# Patient Record
Sex: MALE | Race: WHITE | NOT HISPANIC OR LATINO | Employment: FULL TIME
[De-identification: names, ages, dates, MRNs, and addresses within clinical notes are randomized per-mention and may not be internally consistent; named-entity substitution may affect disease eponyms.]

---

## 2019-09-29 ENCOUNTER — HEALTH MAINTENANCE LETTER (OUTPATIENT)
Age: 39
End: 2019-09-29

## 2021-01-14 ENCOUNTER — HEALTH MAINTENANCE LETTER (OUTPATIENT)
Age: 41
End: 2021-01-14

## 2021-10-23 ENCOUNTER — HEALTH MAINTENANCE LETTER (OUTPATIENT)
Age: 41
End: 2021-10-23

## 2022-02-12 ENCOUNTER — HEALTH MAINTENANCE LETTER (OUTPATIENT)
Age: 42
End: 2022-02-12

## 2022-10-10 ENCOUNTER — HEALTH MAINTENANCE LETTER (OUTPATIENT)
Age: 42
End: 2022-10-10

## 2023-03-25 ENCOUNTER — HEALTH MAINTENANCE LETTER (OUTPATIENT)
Age: 43
End: 2023-03-25

## 2023-07-15 ENCOUNTER — OFFICE VISIT (OUTPATIENT)
Dept: URGENT CARE | Facility: URGENT CARE | Age: 43
End: 2023-07-15
Payer: COMMERCIAL

## 2023-07-15 VITALS
SYSTOLIC BLOOD PRESSURE: 134 MMHG | DIASTOLIC BLOOD PRESSURE: 90 MMHG | HEART RATE: 67 BPM | RESPIRATION RATE: 18 BRPM | OXYGEN SATURATION: 100 % | TEMPERATURE: 98.2 F | WEIGHT: 171.8 LBS

## 2023-07-15 DIAGNOSIS — G43.809 OTHER MIGRAINE WITHOUT STATUS MIGRAINOSUS, NOT INTRACTABLE: Primary | ICD-10-CM

## 2023-07-15 DIAGNOSIS — Z76.0 MEDICATION REFILL: ICD-10-CM

## 2023-07-15 PROCEDURE — 99203 OFFICE O/P NEW LOW 30 MIN: CPT | Mod: 25 | Performed by: FAMILY MEDICINE

## 2023-07-15 PROCEDURE — 96372 THER/PROPH/DIAG INJ SC/IM: CPT | Performed by: FAMILY MEDICINE

## 2023-07-15 RX ORDER — SUMATRIPTAN 25 MG/1
25 TABLET, FILM COATED ORAL
Qty: 9 TABLET | Refills: 0 | Status: SHIPPED | OUTPATIENT
Start: 2023-07-15 | End: 2023-08-21

## 2023-07-15 RX ORDER — SUMATRIPTAN 100 MG/1
100 TABLET, FILM COATED ORAL PRN
COMMUNITY
Start: 2023-03-21 | End: 2023-08-21

## 2023-07-15 RX ORDER — SUMATRIPTAN 6 MG/.5ML
6 INJECTION, SOLUTION SUBCUTANEOUS ONCE
Status: COMPLETED | OUTPATIENT
Start: 2023-07-15 | End: 2023-07-15

## 2023-07-15 RX ADMIN — SUMATRIPTAN 6 MG: 6 INJECTION, SOLUTION SUBCUTANEOUS at 09:42

## 2023-07-15 NOTE — PROGRESS NOTES
Chief Complaint   Patient presents with     Migraine     Need refill on Imitrex        Varun was seen today for migraine.    Diagnoses and all orders for this visit:    Other migraine without status migrainosus, not intractable  -     SUMAtriptan (IMITREX) injection 6 mg  -     SUMAtriptan (IMITREX) 25 MG tablet; Take 1 tablet (25 mg) by mouth at onset of headache for migraine May repeat in 2 hours. Max 8 tablets/24 hours.    Medication refill    MDM  The patient presented with a headache consistent with their normal migraine. The patient has not had any fever, weakness, neck stiffness or confusion. There is no evidence of meningitisThe pain has improved with medication interventions.  The patient should follow-up with their primary physician within 3 days. Return if increasing pain, fever, vomiting or weakness.  Take medications as directed.also was given a refill for imitrex which would help with future migraines   As this headache is similar to other migraine episodes no further work-up was needed at this point.  For the eczema noted in first of the year patient does use topical steroid ointment to help with the symptoms.  He did start feeling better after the imitrex shot       SUBJECTIVE:  Varun Law is a 43 year old male who comes in for migraine headache.  Headache began 1day(s)}ago and is sudden onset  DESCRIPTION OF HEADACHE:   Location of pain: right-sided unilateral   Radiation of pain?: NO   Character of pain:aching   Severity of pain: moderate   Accompanying symptoms: nausea and photophobia   Prodromal sx?: none   Rapidity of onset: abrupt     History of Migranes: Yes   Are most headaches similar in presentation? YES    TYPICAL PRECIPITANTS OF HEADACHE: none    CURRENT USE OF MEDS TO TREAT HA:   Abortive meds? aspirin/acetaminophen/caffeine and sumatriptan PO    Daily use? NO   Prophylactic meds? none    ADDITIONAL RELEVANT HISTORY:  Exposure to carbon monoxide? NO  Substance use: denies any  use  Neurologic ROS: Denies, dizziness, syncope, focal weakness, sensory deficits, paresthesias, aphasia and tremors.    History reviewed. No pertinent past medical history.  Current Outpatient Medications   Medication Sig Dispense Refill     SUMAtriptan (IMITREX) 25 MG tablet Take 1 tablet (25 mg) by mouth at onset of headache for migraine May repeat in 2 hours. Max 8 tablets/24 hours. 9 tablet 0     desonide (DESOWEN) 0.05 % cream Apply sparingly twice per day as needed to affected area until the skin better, then stop. (Patient not taking: Reported on 7/15/2023) 60 g 0     SUMAtriptan (IMITREX) 100 MG tablet Take 100 mg by mouth as needed (Patient not taking: Reported on 7/15/2023)       Social History     Tobacco Use     Smoking status: Never     Smokeless tobacco: Current     Last attempt to quit: 1/7/2007   Substance Use Topics     Alcohol use: Yes     Comment: occasionally       ROS:   Review of systems negative except as stated above.  OBJECTIVE:  BP (!) 134/90 (BP Location: Right arm, Patient Position: Sitting, Cuff Size: Adult Large)   Pulse 67   Temp 98.2  F (36.8  C) (Oral)   Resp 18   Wt 77.9 kg (171 lb 12.8 oz)   SpO2 100%   GENERAL APPEARANCE: healthy, alert and no distress  EYES: EOMI,  PERRL, conjunctiva clear  HENT: ear canals and TM's normal.  Nose and mouth without ulcers, erythema or lesions  Skin - there is skin excoriation noted in front of the ears bilaterally   NECK: supple, nontender, no lymphadenopathy, no stiffness noted   CV: regular rates and rhythm, normal S1 S2, no murmur noted  NEURO: Normal strength and tone, sensory exam grossly normal,  normal speech and mentation  PSYCH: mentation appears normal    Yanely Capps MD

## 2023-07-15 NOTE — PATIENT INSTRUCTIONS
Follow up if  symptoms fail to improve or worsens   Pt understood and agreed with plan       Yanely Capps MD

## 2023-08-21 ENCOUNTER — VIRTUAL VISIT (OUTPATIENT)
Dept: INTERNAL MEDICINE | Facility: CLINIC | Age: 43
End: 2023-08-21
Payer: COMMERCIAL

## 2023-08-21 DIAGNOSIS — G43.809 OTHER MIGRAINE WITHOUT STATUS MIGRAINOSUS, NOT INTRACTABLE: Primary | ICD-10-CM

## 2023-08-21 PROCEDURE — 99214 OFFICE O/P EST MOD 30 MIN: CPT | Mod: 95 | Performed by: INTERNAL MEDICINE

## 2023-08-21 RX ORDER — SUMATRIPTAN 50 MG/1
50-100 TABLET, FILM COATED ORAL
Qty: 12 TABLET | Refills: 5 | Status: SHIPPED | OUTPATIENT
Start: 2023-08-21 | End: 2024-07-02

## 2023-08-21 NOTE — PROGRESS NOTES
"Varun is a 43 year old who is being evaluated via a billable video visit.      How would you like to obtain your AVS? MyChart  If the video visit is dropped, the invitation should be resent by: Text to cell phone: [phone]  Will anyone else be joining your video visit? No          Assessment & Plan     Other migraine without status migrainosus, not intractable  Having 4-8 migraines/mo currently. Would be good candidate for a preventative. Rec'd in-clinic visit w/a provider to discuss options.  Until then cont triptan for prn use    - SUMAtriptan (IMITREX) 50 MG tablet; Take 1-2 tablets ( mg) by mouth at onset of headache for migraine , may repeat after 2 hours if needed (Max 200 mg within 24 hrs)      I spent a total of 30 minutes on the day of the visit.   Time spent by me doing chart review, history and exam, documentation and further activities per the note           Sly Krueger MD  [facility]    Subjective   Varun is a 43 year old, presenting for the following health issues:  Migraine      History of Present Illness       Headaches:   Since the patient's last clinic visit, headaches are: worsened  The patient is getting headaches:  Several a month  He is not able to do normal daily activities when he has a migraine.  The patient is taking the following rescue/relief medications:  Naproxyn (Aleve) and sumatriptan (Imitrex)   Patient states \"The relief is inconsistent\" from the rescue/relief medications.   The patient is taking the following medications to prevent migraines:  No medications to prevent migraines  In the past 4 weeks, the patient has gone to an Urgent Care or Emergency Room 0 times times due to headaches.    He eats 2-3 servings of fruits and vegetables daily.He consumes 4 sweetened beverage(s) daily.He exercises with enough effort to increase his heart rate 10 to 19 minutes per day.  He exercises with enough effort to increase his heart rate 5 days " per week.   He is taking medications regularly.               Review of Systems         Objective           Vitals:  No vitals were obtained today due to virtual visit.    Physical Exam   GENERAL: alert and no distress  NEURO: Cranial nerves grossly intact.  Mentation and speech appropriate for age.                Video-Visit Details    Type of service:  Video Visit     Originating Location (pt. Location): Home    Distant Location (provider location):  On-site  Platform used for Video Visit: Compl[employer]

## 2024-02-24 ENCOUNTER — OFFICE VISIT (OUTPATIENT)
Dept: URGENT CARE | Facility: URGENT CARE | Age: 44
End: 2024-02-24
Payer: COMMERCIAL

## 2024-02-24 VITALS
OXYGEN SATURATION: 98 % | SYSTOLIC BLOOD PRESSURE: 150 MMHG | RESPIRATION RATE: 18 BRPM | TEMPERATURE: 98 F | HEART RATE: 85 BPM | DIASTOLIC BLOOD PRESSURE: 105 MMHG | WEIGHT: 186 LBS

## 2024-02-24 DIAGNOSIS — R03.0 ELEVATED BLOOD PRESSURE READING WITHOUT DIAGNOSIS OF HYPERTENSION: ICD-10-CM

## 2024-02-24 DIAGNOSIS — M70.52 PATELLAR BURSITIS OF LEFT KNEE: Primary | ICD-10-CM

## 2024-02-24 PROCEDURE — 99213 OFFICE O/P EST LOW 20 MIN: CPT | Performed by: PHYSICIAN ASSISTANT

## 2024-02-24 RX ORDER — NAPROXEN 500 MG/1
500 TABLET ORAL 2 TIMES DAILY WITH MEALS
Qty: 30 TABLET | Refills: 3 | Status: SHIPPED | OUTPATIENT
Start: 2024-02-24 | End: 2025-02-23

## 2024-02-24 NOTE — PROGRESS NOTES
Assessment & Plan     Patellar bursitis of left knee    Rest, ice compresses  Ace wrap  Avoid kneeling and placing pressure on knee    Start on medications  - naproxen (NAPROSYN) 500 MG tablet; Take 1 tablet (500 mg) by mouth 2 times daily (with meals)    Follow up with ortho if you want to have this drained      Elevated blood pressure reading without diagnosis of hypertension    Patient advised to follow up with PCP for recheck blood pressure   Information given to patient on diet modifications, including lowering salt in diet, decrease calories, weight loss and exercise.  Monitor blood pressure at home and if BP maintains over 140/90 then advise having a recheck with PCP in 2 weeks.       CONSULTATION/REFERRAL to Ortho    No follow-ups on file.    Roseanna Bond is a 43 year old, presenting for the following health issues:  Knee Pain (Bursitis on the left knee. Started this afternoon )    HPI     Review of Systems  Constitutional, HEENT, cardiovascular, pulmonary, gi and gu systems are negative, except as otherwise noted.      Objective    BP (!) 150/105 (BP Location: Left arm, Patient Position: Sitting, Cuff Size: Adult Regular)   Pulse 85   Temp 98  F (36.7  C) (Oral)   Resp 18   Wt 84.4 kg (186 lb)   SpO2 98%   There is no height or weight on file to calculate BMI.  Physical Exam   GENERAL: alert and no distress  MS: pos for left knee swelling on top of knee cap  SKIN: pos for gold ball size bursitis left anterior knee  NEURO: Normal strength and tone, mentation intact and speech normal  PSYCH: mentation appears normal, affect normal/bright          Signed Electronically by: Andre Fitzgerald, [facility], PASuzieC

## 2024-05-26 ENCOUNTER — HEALTH MAINTENANCE LETTER (OUTPATIENT)
Age: 44
End: 2024-05-26

## 2024-07-02 DIAGNOSIS — G43.809 OTHER MIGRAINE WITHOUT STATUS MIGRAINOSUS, NOT INTRACTABLE: ICD-10-CM

## 2024-07-02 RX ORDER — SUMATRIPTAN 50 MG/1
50-100 TABLET, FILM COATED ORAL
Qty: 12 TABLET | Refills: 0 | Status: SHIPPED | OUTPATIENT
Start: 2024-07-02

## 2024-07-02 NOTE — LETTER
[facility] [address] [place] [address]  [phone]  July 3, 2024  Varun Law  [address] [place] [address]   [facility] [address]    Dear Varun,    I am contacting you regarding the refill request we received for you. After reviewing your chart it looks like you are overdue for your annual and for a med check IN PERSON. Please call [phone] or schedule this through my chart to continue to receive refills. If you anticipate running out before your appointment let us know and we can send in a rosy refill.       Thank you,     [facility] nursing staff

## 2024-07-02 NOTE — TELEPHONE ENCOUNTER
I havent seen this patient since 2012.   Saw Dr. Krueger most recently in 2023 for this issue.   Will defer refill to him.

## 2025-03-31 ENCOUNTER — VIRTUAL VISIT (OUTPATIENT)
Dept: INTERNAL MEDICINE | Facility: CLINIC | Age: 45
End: 2025-03-31
Payer: COMMERCIAL

## 2025-03-31 DIAGNOSIS — G43.009 MIGRAINE WITHOUT AURA AND WITHOUT STATUS MIGRAINOSUS, NOT INTRACTABLE: Primary | ICD-10-CM

## 2025-03-31 DIAGNOSIS — E78.5 HYPERLIPIDEMIA LDL GOAL <130: ICD-10-CM

## 2025-03-31 PROBLEM — H60.8X3 CHRONIC ECZEMATOUS OTITIS EXTERNA OF BOTH EARS: Status: ACTIVE | Noted: 2021-08-27

## 2025-03-31 PROBLEM — F41.9 ANXIETY: Status: ACTIVE | Noted: 2021-08-27

## 2025-03-31 PROBLEM — F33.42 RECURRENT MAJOR DEPRESSIVE DISORDER, IN FULL REMISSION: Status: ACTIVE | Noted: 2021-08-27

## 2025-03-31 PROCEDURE — 98006 SYNCH AUDIO-VIDEO EST MOD 30: CPT | Performed by: INTERNAL MEDICINE

## 2025-03-31 RX ORDER — SUMATRIPTAN 50 MG/1
50-100 TABLET, FILM COATED ORAL
Qty: 12 TABLET | Refills: 5 | Status: SHIPPED | OUTPATIENT
Start: 2025-03-31

## 2025-03-31 RX ORDER — ONDANSETRON 4 MG/1
4 TABLET, ORALLY DISINTEGRATING ORAL EVERY 8 HOURS PRN
Qty: 30 TABLET | Refills: 2 | Status: SHIPPED | OUTPATIENT
Start: 2025-03-31

## 2025-03-31 NOTE — PROGRESS NOTES
Varun is a 45 year old who is being evaluated via a billable video visit.    How would you like to obtain your AVS? Verified Person  If the video visit is dropped, the invitation should be resent by: Text to cell phone: [phone]  Will anyone else be joining your video visit? No      Assessment & Plan     (G43.009) Migraine without aura and without status migrainosus, not intractable  (primary encounter diagnosis)  Comment: OK to contineu Sumatriptan. Take as soon as it becomecs clear a migraine is going to happen, evern before the headache.   More frequent once weekly during the 1-2 months in spring and fall during unstable weather, then twice monthly all other months.   He meeting criteria for prophylactic medication, but not at this time.   Avoid any known triggers (dehydration, lack of sleep, etc for him).   Referral to neurology for other treatment optiosn if needed.   Plan: SUMAtriptan (IMITREX) 50 MG tablet, ondansetron        (ZOFRAN ODT) 4 MG ODT tab              (E78.5) Hyperlipidemia LDL goal <130  Comment: LDL quite elvated over many past labs.   Due for recheck of cholesterol, last done 2019.     Plan:        Return for a routine phsyical this summer with labs, schedule a follow up visit sooner if needed for anything else.  Use Verified Person or Call [phone] to schedule the appointment with me.             Subjective   Varun is a 45 year old, presenting for the following health issues:    Last seen by me in 2009  Last encounter with [facility] August 2023 in virtual visit.   Seen at [facility] [phone] due to insurnace changes.       Migraine (Refill meds)        3/31/2025     9:41 AM   Additional Questions   Roomed by Mana STOKES CMA     HPI      Migraine   Since your last clinic visit, how have your headaches changed?  Worsened-with temperature changes  How often are you getting headaches or migraines? 3-4 in last month   Are you able to do normal daily activities when you have a migraine? Yes-if taken  meds  Are you taking rescue/relief medications? (Select all that apply) naproxyn (Aleve) and sumatriptan (Imitrex)  How helpful is your rescue/relief medication?  I get total relief if taking med-might have to use 2 tabs  Are you taking any medications to prevent migraines? (Select all that apply)  No  In the past 4 weeks, how often have you gone to urgent care or the emergency room because of your headaches?  0    2.)  history of very elevated lipids in last labsa t [facility] in 2019  Reviewed labs in Care Everywhere.   LDL were 175-195 in 20-    **I reviewed the information recorded in the patient's EPIC chart (including but not limited to medical history, surgical history, family history, problem list, medication list, and allergy list) and updated the information as indicated based on the patients reported information.         Review of Systems  Constitutional, HEENT, cardiovascular, pulmonary, gi and gu systems are negative, except as otherwise noted.      Objective    Vitals - Patient Reported  Weight (Patient Reported): 79.4 kg (175 lb)  Height (Patient Reported): 182.9 cm (6')  BMI (Based on Pt Reported Ht/Wt): 23.73      Vitals:  No vitals were obtained today due to virtual visit.    Physical Exam   GENERAL: alert and no distress  EYES: Eyes grossly normal to inspection.  No discharge or erythema, or obvious scleral/conjunctival abnormalities.  RESP: No audible wheeze, cough, or visible cyanosis.    SKIN: Visible skin clear. No significant rash, abnormal pigmentation or lesions.  NEURO: Cranial nerves grossly intact.  Mentation and speech appropriate for age.  PSYCH: Appropriate affect, tone, and pace of words          Video-Visit Details    Type of service:  Video Visit   Originating Location (pt. Location): Home    Distant Location (provider location):  On-site  Platform used for Video Visit: Alden    The longitudinal plan of care for the diagnosis(es)/condition(s) as documented were  addressed during this visit. Due to the added complexity in care, I will continue to support Varun in the subsequent management and with ongoing continuity of care.      Signed Electronically by: Nathen Aguero MD

## 2025-04-04 ASSESSMENT — ANXIETY QUESTIONNAIRES
7. FEELING AFRAID AS IF SOMETHING AWFUL MIGHT HAPPEN: SEVERAL DAYS
GAD7 TOTAL SCORE: 10
GAD7 TOTAL SCORE: 10
1. FEELING NERVOUS, ANXIOUS, OR ON EDGE: MORE THAN HALF THE DAYS
2. NOT BEING ABLE TO STOP OR CONTROL WORRYING: MORE THAN HALF THE DAYS
5. BEING SO RESTLESS THAT IT IS HARD TO SIT STILL: NOT AT ALL
4. TROUBLE RELAXING: MORE THAN HALF THE DAYS
8. IF YOU CHECKED OFF ANY PROBLEMS, HOW DIFFICULT HAVE THESE MADE IT FOR YOU TO DO YOUR WORK, TAKE CARE OF THINGS AT HOME, OR GET ALONG WITH OTHER PEOPLE?: SOMEWHAT DIFFICULT
IF YOU CHECKED OFF ANY PROBLEMS ON THIS QUESTIONNAIRE, HOW DIFFICULT HAVE THESE PROBLEMS MADE IT FOR YOU TO DO YOUR WORK, TAKE CARE OF THINGS AT HOME, OR GET ALONG WITH OTHER PEOPLE: SOMEWHAT DIFFICULT
6. BECOMING EASILY ANNOYED OR IRRITABLE: SEVERAL DAYS
3. WORRYING TOO MUCH ABOUT DIFFERENT THINGS: MORE THAN HALF THE DAYS
7. FEELING AFRAID AS IF SOMETHING AWFUL MIGHT HAPPEN: SEVERAL DAYS
GAD7 TOTAL SCORE: 10

## 2025-04-07 ENCOUNTER — VIRTUAL VISIT (OUTPATIENT)
Dept: INTERNAL MEDICINE | Facility: CLINIC | Age: 45
End: 2025-04-07
Payer: COMMERCIAL

## 2025-04-07 DIAGNOSIS — G43.009 MIGRAINE WITHOUT AURA AND WITHOUT STATUS MIGRAINOSUS, NOT INTRACTABLE: ICD-10-CM

## 2025-04-07 DIAGNOSIS — E78.5 HYPERLIPIDEMIA LDL GOAL <130: ICD-10-CM

## 2025-04-07 DIAGNOSIS — Z13.29 SCREENING FOR THYROID DISORDER: ICD-10-CM

## 2025-04-07 DIAGNOSIS — F33.1 MODERATE EPISODE OF RECURRENT MAJOR DEPRESSIVE DISORDER (H): Primary | ICD-10-CM

## 2025-04-07 DIAGNOSIS — Z13.6 CARDIOVASCULAR SCREENING; LDL GOAL LESS THAN 130: ICD-10-CM

## 2025-04-07 PROCEDURE — 98005 SYNCH AUDIO-VIDEO EST LOW 20: CPT | Performed by: INTERNAL MEDICINE

## 2025-04-07 RX ORDER — CITALOPRAM HYDROBROMIDE 20 MG/1
TABLET ORAL
Qty: 90 TABLET | Refills: 0 | Status: SHIPPED | OUTPATIENT
Start: 2025-04-07

## 2025-04-07 NOTE — PROGRESS NOTES
Varun is a 45 year old who is being evaluated via a billable video visit.    How would youlike to obtain your AVS? MyChart  If the video visit is dropped, the invitation should be resent by: Text to cell phone: [phone]  Will anyone else be joining your video visit? No      Assessment & Plan     (F33.1) Moderate episode of recurrent major depressive disorder (H)  (primary encounter diagnosis)  Comment: symptoms returning.   Resume citalopram.   Start 10 mg daily for 10 days, then 20 mg daily.   Has been on 40 mg the last time he was on it, titrate in the futre accordingly based on mood response.   Discussed the specific side effects of this medication including, but not limited to, disordered eating, sleeping, changes in sexual desire or performance, paradoxical worsening of mood, and reports of increased risk of suicide in teens.  They promised to stop the medication and call if any adverse side effects or changes in mood.    Plan: citalopram (CELEXA) 20 MG tablet, Lipid panel         reflex to direct LDL Fasting, Basic metabolic         panel, CBC with platelets, TSH with free T4         reflex, AST, ALT            (G43.009) Migraine without aura and without status migrainosus, not intractable  Comment: This condition is currently controlled on the current medical regimen.  Continue current therapy.   Plan: Lipid panel reflex to direct LDL Fasting, Basic        metabolic panel, CBC with platelets, TSH with         free T4 reflex, AST, ALT            (E78.5) Hyperlipidemia LDL goal <130  Comment: recheck labs at next visit   Plan: Lipid panel reflex to direct LDL Fasting, Basic        metabolic panel, CBC with platelets, TSH with         free T4 reflex, AST, ALT            (Z13.6) CARDIOVASCULAR SCREENING; LDL GOAL LESS THAN 130  Comment:   Plan: Lipid panel reflex to direct LDL Fasting, Basic        metabolic panel, CBC with platelets, TSH with         free T4 reflex, AST, ALT            (Z13.29) Screening for  "thyroid disorder  Comment:   Plan: Lipid panel reflex to direct LDL Fasting, Basic        metabolic panel, CBC with platelets, TSH with         free T4 reflex, AST, ALT                 Return to see me in 3 months, schedule a follow up visit sooner if needed for anything else.  Please get fasting labs done at the [facility] or any other [facility] Lab lab 1-2 days before this appointment (schedule a \"lab appointment\").  If you get the labs done at another clinic, make arrangements with them directly.  The orders will be in place.  Eat nothing for at least 8 hours prior to having these labs drawn.  Use [employer] or Call [phone] to schedule the appointment with me and lab appointment.          Roseanna Bond is a 45 year old, presenting for the following health issues:   Follow Up (Would like to go back on Citalopram)        4/7/2025     4:40 PM   Additional Questions   Roomed by Mana STOKES CMA     History of Present Illness       Mental Health Follow-up:  Patient presents to follow-up on Depression & Anxiety.Patient's depression since last visit has been:  Worse  The patient is not having other symptoms associated with depression.  Patient's anxiety since last visit has been:  Worse  The patient is not having other symptoms associated with anxiety.  Any significant life events: grief or loss  Patient is not feeling anxious or having panic attacks.  Patient has no concerns about alcohol or drug use.    He eats 4 or more servings of fruits and vegetables daily.He consumes 3 sweetened beverage(s) daily.He exercises with enough effort to increase his heart rate 9 or less minutes per day.  He exercises with enough effort to increase his heart rate 3 or less days per week.   He is taking medications regularly.      history of depression dating back 20 years.   Feels like symptoms are returning enough to want to go back onto citalopram.     Has been on citalopram at various times with good effect on " mood, without significant side effects   No longer smoking regula marijuana.       2.)  migraine well controlled with intermittetn sumatriptan.   Gets 3-4 times per month durign spring and fall, then 1-2 times per month otherwise.     3.)  history of elevated choeslterol   Reviewed labs in chart.       **I reviewed the information recorded in the patient's EPIC chart (including but not limited to medical history, surgical history, family history, problem list, medication list, and allergy list) and updated the information as indicated based on the patients reported information.         Review of Systems  Constitutional, HEENT, cardiovascular, pulmonary, gi and gu systems are negative, except as otherwise noted.      Objective    Vitals - Patient Reported  Weight (Patient Reported): 79.4 kg (175 lb)  Height (Patient Reported): 182.9 cm (6')  BMI (Based on Pt Reported Ht/Wt): 23.73        Physical Exam   GENERAL: alert and no distress  EYES: Eyes grossly normal to inspection.  No discharge or erythema, or obvious scleral/conjunctival abnormalities.  RESP: No audible wheeze, cough, or visible cyanosis.    SKIN: Visible skin clear. No significant rash, abnormal pigmentation or lesions.  NEURO: Cranial nerves grossly intact.  Mentation and speech appropriate for age.  PSYCH: Appropriate affect, tone, and pace of words          Video-Visit Details    Type of service:  Video Visit     Joined the call at 4/7/2025, 6:01:02 pm.  Left the call at 4/7/2025, 6:13:35 pm.  You were on the call for 12 minutes 32 seconds .    Originating Location (pt. Location): Home    Distant Location (provider location):  On-site  Platform used for Video Visit: Alden  Signed Electronically by: Nathen Aguero MD

## 2025-04-07 NOTE — PATIENT INSTRUCTIONS
"  Start Citalopram (generic Celexa) 10 mg (1/2 of 20 mg tablet) once per day for 10 days, then 20 mg once per day.      Let us know if you experience any significant side effects, which may include disordered appetite, disordered sleeping, intestinal upset.  Specifically let us know if you experience increase anxiety or depression.  Call [phone] ([facility] Internal Medicine Nurse Line) with any questions or concerns.      Return to see me in 3 months, schedule a follow up visit sooner if needed for anything else.  Please get fasting labs done at the [facility] or any other [facility] Lab lab 1-2 days before this appointment (schedule a \"lab appointment\").  If you get the labs done at another clinic, make arrangements with them directly.  The orders will be in place.  Eat nothing for at least 8 hours prior to having these labs drawn.  Use Streamline or Call [phone] to schedule the appointment with me and lab appointment.      "

## 2025-06-14 ENCOUNTER — HEALTH MAINTENANCE LETTER (OUTPATIENT)
Age: 45
End: 2025-06-14

## 2025-07-14 DIAGNOSIS — F33.1 MODERATE EPISODE OF RECURRENT MAJOR DEPRESSIVE DISORDER (H): ICD-10-CM

## 2025-07-14 RX ORDER — CITALOPRAM HYDROBROMIDE 20 MG/1
20 TABLET ORAL DAILY
Qty: 90 TABLET | Refills: 0 | Status: SHIPPED | OUTPATIENT
Start: 2025-07-14